# Patient Record
Sex: FEMALE | Race: ASIAN | NOT HISPANIC OR LATINO | URBAN - METROPOLITAN AREA
[De-identification: names, ages, dates, MRNs, and addresses within clinical notes are randomized per-mention and may not be internally consistent; named-entity substitution may affect disease eponyms.]

---

## 2023-11-24 ENCOUNTER — EMERGENCY (EMERGENCY)
Facility: HOSPITAL | Age: 63
LOS: 1 days | Discharge: HOME | End: 2023-11-24
Attending: STUDENT IN AN ORGANIZED HEALTH CARE EDUCATION/TRAINING PROGRAM | Admitting: STUDENT IN AN ORGANIZED HEALTH CARE EDUCATION/TRAINING PROGRAM
Payer: MEDICAID

## 2023-11-24 VITALS
DIASTOLIC BLOOD PRESSURE: 112 MMHG | SYSTOLIC BLOOD PRESSURE: 189 MMHG | OXYGEN SATURATION: 100 % | HEART RATE: 72 BPM | RESPIRATION RATE: 16 BRPM

## 2023-11-24 VITALS
DIASTOLIC BLOOD PRESSURE: 104 MMHG | RESPIRATION RATE: 18 BRPM | OXYGEN SATURATION: 99 % | SYSTOLIC BLOOD PRESSURE: 214 MMHG | TEMPERATURE: 98 F | HEART RATE: 89 BPM

## 2023-11-24 PROCEDURE — 99284 EMERGENCY DEPT VISIT MOD MDM: CPT

## 2023-11-24 RX ORDER — CYCLOBENZAPRINE HYDROCHLORIDE 10 MG/1
1 TABLET, FILM COATED ORAL
Qty: 15 | Refills: 0
Start: 2023-11-24 | End: 2023-11-28

## 2023-11-24 RX ORDER — LIDOCAINE 4 G/100G
1 CREAM TOPICAL ONCE
Refills: 0 | Status: COMPLETED | OUTPATIENT
Start: 2023-11-24 | End: 2023-11-24

## 2023-11-24 RX ORDER — IBUPROFEN 200 MG
400 TABLET ORAL ONCE
Refills: 0 | Status: COMPLETED | OUTPATIENT
Start: 2023-11-24 | End: 2023-11-24

## 2023-11-24 RX ORDER — ACETAMINOPHEN 500 MG
975 TABLET ORAL ONCE
Refills: 0 | Status: COMPLETED | OUTPATIENT
Start: 2023-11-24 | End: 2023-11-24

## 2023-11-24 RX ORDER — CYCLOBENZAPRINE HYDROCHLORIDE 10 MG/1
5 TABLET, FILM COATED ORAL ONCE
Refills: 0 | Status: COMPLETED | OUTPATIENT
Start: 2023-11-24 | End: 2023-11-24

## 2023-11-24 RX ADMIN — CYCLOBENZAPRINE HYDROCHLORIDE 5 MILLIGRAM(S): 10 TABLET, FILM COATED ORAL at 17:04

## 2023-11-24 RX ADMIN — Medication 400 MILLIGRAM(S): at 17:03

## 2023-11-24 RX ADMIN — LIDOCAINE 1 PATCH: 4 CREAM TOPICAL at 17:04

## 2023-11-24 RX ADMIN — Medication 975 MILLIGRAM(S): at 17:03

## 2023-11-24 NOTE — ED PROVIDER NOTE - CLINICAL SUMMARY MEDICAL DECISION MAKING FREE TEXT BOX
Jabari AREVALO:   exam vitals stable aside from high blood pressure otherwise nontoxic-appearing physical exam as above, DDx suspect likely sciatica type pain, patient with no urinary symptoms she can move everything and feel everything comfortably ambulatory no evidence of neural compromise no midline pain no fever, will give meds for pain control, will have patient follow-up with PMD for blood pressure control, low suspicion for hypertensive emergency at this time, will refer for orthopedic follow-up outpatient strict return precautions were discussed patient reported understanding.

## 2023-11-24 NOTE — ED PROVIDER NOTE - PROGRESS NOTE DETAILS
Jabari AREVALO: noted BP prior to dc. pt reports no symptoms to suggest hypertensive emergency, suspect likely sciatica, will have pt follow up w PMD for BP management.

## 2023-11-24 NOTE — ED ADULT NURSE REASSESSMENT NOTE - NS ED NURSE REASSESS COMMENT FT1
Patient is being discharged today. Left leaving ER with family. Education provided via teach back method and written materials to patient and pt verbalizes understanding. Medication reconciliation completed. All personal belongings with patient. No complaints/signs/symptoms of pain, distress, or discomfort at this time.

## 2023-11-24 NOTE — ED ADULT TRIAGE NOTE - CHIEF COMPLAINT QUOTE
Pt AOX4 c/o c/o Right-sided sciatic pain x 2 week, Hx of HTN and DM2 bgl: 166  BP elevated in triage, checked on both arms, denies headache, denies vision changes, denies dizziness

## 2023-11-24 NOTE — ED PROVIDER NOTE - PATIENT PORTAL LINK FT
You can access the FollowMyHealth Patient Portal offered by [facility] by registering at the following website: [email or web address]. By joining [employer]’s FollowMyHealth portal, you will also be able to view your health information using other applications (apps) compatible with our system.

## 2023-11-24 NOTE — ED PROVIDER NOTE - NSFOLLOWUPCLINICS_GEN_ALL_ED_FT
[facility]  Orthopedic Surgery  [address], 3rd & 4th [place] [address]  Phone: [phone]  Fax:     [facility]  Orthopedic Surgery  [address] [address]  Phone: [phone]  Fax:

## 2023-11-24 NOTE — ED PROVIDER NOTE - PHYSICAL EXAMINATION
Jabari AREVALO:  VITALS: Initial triage and subsequent vitals have been reviewed by me.  GEN APPEARANCE: Alert, cooperative. Non-toxic appearing. Well appearing. NAD.  HEAD: Atraumatic, normocephalic   EYES: PERRLa, EOMI, vision grossly intact.   EARS: Gross hearing intact.   NOSE: No nasal discharge, no external evidence of epistaxis.   NECK: Supple  CV: RRR, S1S2, no c/r/m/g. No cyanosis. Extremities warm, well perfused. Cap refill <2 seconds. No bruits.   LUNGS: CTAB. No wheezing. No rales. No rhonchi. No diminished breath sounds.   ABDOMEN: Soft, NTND. No guarding or rebound. No masses.   MSK/EXT: Spine appears normal, no spine point tenderness. No CVA ttp. Normal muscular development. Pelvis stable. No obvious joint or bony deformity, no peripheral edema. +2 DP   NEURO: Alert, follows commands. Weight bearing normal. Speech normal. Sensation and motor normal x4 extremities. No saddle anesthesia   SKIN: Normal color for race, warm, dry and intact. No evidence of rash.  PSYCH: Normal mood and affect.

## 2023-11-24 NOTE — ED ADULT NURSE NOTE - NSFALLUNIVINTERV_ED_ALL_ED
Bed/Stretcher in lowest position, wheels locked, appropriate side rails in place/Call bell, personal items and telephone in reach/Instruct patient to call for assistance before getting out of bed/chair/stretcher/Non-slip footwear applied when patient is off stretcher/[place] to call system/Physically safe environment - no spills, clutter or unnecessary equipment/Purposeful proactive rounding/Room/bathroom lighting operational, light cord in reach

## 2023-11-24 NOTE — ED PROVIDER NOTE - NSFOLLOWUPINSTRUCTIONS_ED_ALL_ED_FT
Thank you for visiting our Emergency Department, it has been a pleasure taking part in your healthcare.  Please read and follow all of your discharge instructions. These instructions contain important information regarding your Emergency Department visit and future medical care.     Your discharge diagnosis is: sciatica   Please take all discharge medications as indicated below:  Please continue all medications as rx'd by your PMD.  Please follow up with your Primary Care Doctor (PMD) within x48 hours.  Bring and show your PMD all documents and results you were given during your ED visit.  If you do not have a primary care doctor please call [phone] to establish primary care.  A copy of resulted labs, imaging, and findings have been provided to you.   You can also access all of your results through the [facility].  If you have questions about your results, please call the Emergency Department.  During your visit and at time of discharge, you had a detailed discussion with your provider regarding your diagnosis, care management and discharge planning.  Topics that were discussed included but were not limited to: return precautions, follow up visits with existing or new providers, new prescriptions and/or medication changes, wound and/or splint/cast care, incidental laboratory/radiology findings, or other care   aspects specific to your diagnosis and treatment. You have been given the opportunity to have your questions answered. At this time you have been deemed stable and fit for discharge.  Return precautions to the Emergency Department include but are not limited to: unrelenting nausea, vomiting, fever, chills, chest pain, shortness of breath, dizziness, chest or abdominal pain, worsening back pain, syncope, blood in urine or stool, headache that doesn't resolve, numbness or tingling, loss of sensation, loss of motor function, or any other concerning symptoms.    Please bring all ED Documents you were given during your stay to your PMD.   They contain important information for you and your PMD, including incidental lab/radiology findings that your PMD should be aware of.

## 2023-11-24 NOTE — ED ADULT NURSE NOTE - OBJECTIVE STATEMENT
pt A&ox4 , coming to ED from home for left lower back pain radiating to left lower leg. pt denies falls or accidents. pt states she was seen at [place] ER for same symptoms and had negative XR and US. bilateral legs are symmetrical is size and shape. pt denies Chest pain and SOB. past medical history: DM2 breathing is spontaneous and unlabored. sating 99% on RA. awaiting MD orders

## 2023-11-24 NOTE — ED PROVIDER NOTE - OBJECTIVE STATEMENT
Jabari AREVALO:  63-year-old female history of diabetes high blood pressure on metoprolol and clonidine metformin and insulin at night, presents with a chief complaint of left-sided buttocks pain radiating down the left lower extremity over the last 2 weeks, patient reports has been seen multiple times at [facility] for same however never was told the etiology of her symptoms.  She reports years ago she had a similar episode with pain localized to her left buttocks area only, never had a radiating down the leg.  No fever she can move everything and feel everything no nausea no vomiting no fever no chest pain or trouble breathing.  No bowel or bladder retention or incontinence she can move everything and feel everything she is comfortably ambulatory.  Noted triage blood pressure patient reports when she has a severe flareup of her pain her blood pressure tends to go up.  No chest pain no tremors no lightheadedness no dizziness no nausea no vomiting.  No headache. No urinary or bowel complaints.